# Patient Record
Sex: MALE | Race: WHITE | NOT HISPANIC OR LATINO | Employment: FULL TIME | URBAN - METROPOLITAN AREA
[De-identification: names, ages, dates, MRNs, and addresses within clinical notes are randomized per-mention and may not be internally consistent; named-entity substitution may affect disease eponyms.]

---

## 2022-01-05 ENCOUNTER — PATIENT MESSAGE (OUTPATIENT)
Dept: ADMINISTRATIVE | Facility: OTHER | Age: 37
End: 2022-01-05
Payer: COMMERCIAL

## 2025-01-06 ENCOUNTER — PATIENT OUTREACH (OUTPATIENT)
Dept: ADMINISTRATIVE | Facility: HOSPITAL | Age: 40
End: 2025-01-06
Payer: COMMERCIAL

## 2025-01-07 ENCOUNTER — LAB VISIT (OUTPATIENT)
Dept: LAB | Facility: OTHER | Age: 40
End: 2025-01-07
Payer: MEDICAID

## 2025-01-07 ENCOUNTER — OFFICE VISIT (OUTPATIENT)
Dept: INTERNAL MEDICINE | Facility: CLINIC | Age: 40
End: 2025-01-07
Payer: MEDICAID

## 2025-01-07 VITALS
OXYGEN SATURATION: 99 % | WEIGHT: 302.94 LBS | HEIGHT: 74 IN | HEART RATE: 78 BPM | DIASTOLIC BLOOD PRESSURE: 84 MMHG | BODY MASS INDEX: 38.88 KG/M2 | SYSTOLIC BLOOD PRESSURE: 120 MMHG

## 2025-01-07 DIAGNOSIS — M21.942 HAND DEFORMITY, ACQUIRED, LEFT: ICD-10-CM

## 2025-01-07 DIAGNOSIS — Z00.00 ANNUAL PHYSICAL EXAM: ICD-10-CM

## 2025-01-07 DIAGNOSIS — R29.818 SUSPECTED SLEEP APNEA: ICD-10-CM

## 2025-01-07 DIAGNOSIS — Z87.09 HISTORY OF ENLARGED TONSILS: ICD-10-CM

## 2025-01-07 DIAGNOSIS — B07.9 WART OF HAND: ICD-10-CM

## 2025-01-07 DIAGNOSIS — Z00.00 ANNUAL PHYSICAL EXAM: Primary | ICD-10-CM

## 2025-01-07 LAB
ALBUMIN SERPL BCP-MCNC: 4.1 G/DL (ref 3.5–5.2)
ALP SERPL-CCNC: 66 U/L (ref 40–150)
ALT SERPL W/O P-5'-P-CCNC: 38 U/L (ref 10–44)
ANION GAP SERPL CALC-SCNC: 9 MMOL/L (ref 8–16)
AST SERPL-CCNC: 22 U/L (ref 10–40)
BASOPHILS # BLD AUTO: 0.04 K/UL (ref 0–0.2)
BASOPHILS NFR BLD: 0.5 % (ref 0–1.9)
BILIRUB SERPL-MCNC: 0.3 MG/DL (ref 0.1–1)
BUN SERPL-MCNC: 14 MG/DL (ref 6–20)
CALCIUM SERPL-MCNC: 10 MG/DL (ref 8.7–10.5)
CHLORIDE SERPL-SCNC: 105 MMOL/L (ref 95–110)
CHOLEST SERPL-MCNC: 231 MG/DL (ref 120–199)
CHOLEST/HDLC SERPL: 6.4 {RATIO} (ref 2–5)
CO2 SERPL-SCNC: 26 MMOL/L (ref 23–29)
CREAT SERPL-MCNC: 1 MG/DL (ref 0.5–1.4)
DIFFERENTIAL METHOD BLD: NORMAL
EOSINOPHIL # BLD AUTO: 0.1 K/UL (ref 0–0.5)
EOSINOPHIL NFR BLD: 1.6 % (ref 0–8)
ERYTHROCYTE [DISTWIDTH] IN BLOOD BY AUTOMATED COUNT: 12.5 % (ref 11.5–14.5)
EST. GFR  (NO RACE VARIABLE): >60 ML/MIN/1.73 M^2
ESTIMATED AVG GLUCOSE: 108 MG/DL (ref 68–131)
GLUCOSE SERPL-MCNC: 97 MG/DL (ref 70–110)
HBA1C MFR BLD: 5.4 % (ref 4–5.6)
HCT VFR BLD AUTO: 45.4 % (ref 40–54)
HCV AB SERPL QL IA: NEGATIVE
HDLC SERPL-MCNC: 36 MG/DL (ref 40–75)
HDLC SERPL: 15.6 % (ref 20–50)
HGB BLD-MCNC: 15.2 G/DL (ref 14–18)
HIV 1+2 AB+HIV1 P24 AG SERPL QL IA: NEGATIVE
IMM GRANULOCYTES # BLD AUTO: 0.03 K/UL (ref 0–0.04)
IMM GRANULOCYTES NFR BLD AUTO: 0.4 % (ref 0–0.5)
LDLC SERPL CALC-MCNC: ABNORMAL MG/DL (ref 63–159)
LYMPHOCYTES # BLD AUTO: 2.5 K/UL (ref 1–4.8)
LYMPHOCYTES NFR BLD: 30.5 % (ref 18–48)
MCH RBC QN AUTO: 30.6 PG (ref 27–31)
MCHC RBC AUTO-ENTMCNC: 33.5 G/DL (ref 32–36)
MCV RBC AUTO: 92 FL (ref 82–98)
MONOCYTES # BLD AUTO: 0.6 K/UL (ref 0.3–1)
MONOCYTES NFR BLD: 7.4 % (ref 4–15)
NEUTROPHILS # BLD AUTO: 4.9 K/UL (ref 1.8–7.7)
NEUTROPHILS NFR BLD: 59.6 % (ref 38–73)
NONHDLC SERPL-MCNC: 195 MG/DL
NRBC BLD-RTO: 0 /100 WBC
PLATELET # BLD AUTO: 261 K/UL (ref 150–450)
PMV BLD AUTO: 11.2 FL (ref 9.2–12.9)
POTASSIUM SERPL-SCNC: 4.5 MMOL/L (ref 3.5–5.1)
PROT SERPL-MCNC: 8.1 G/DL (ref 6–8.4)
RBC # BLD AUTO: 4.96 M/UL (ref 4.6–6.2)
SODIUM SERPL-SCNC: 140 MMOL/L (ref 136–145)
TRIGL SERPL-MCNC: 436 MG/DL (ref 30–150)
WBC # BLD AUTO: 8.14 K/UL (ref 3.9–12.7)

## 2025-01-07 PROCEDURE — 1160F RVW MEDS BY RX/DR IN RCRD: CPT | Mod: CPTII,,,

## 2025-01-07 PROCEDURE — 85025 COMPLETE CBC W/AUTO DIFF WBC: CPT

## 2025-01-07 PROCEDURE — 86803 HEPATITIS C AB TEST: CPT

## 2025-01-07 PROCEDURE — 83036 HEMOGLOBIN GLYCOSYLATED A1C: CPT

## 2025-01-07 PROCEDURE — 3079F DIAST BP 80-89 MM HG: CPT | Mod: CPTII,,,

## 2025-01-07 PROCEDURE — 3008F BODY MASS INDEX DOCD: CPT | Mod: CPTII,,,

## 2025-01-07 PROCEDURE — 80061 LIPID PANEL: CPT

## 2025-01-07 PROCEDURE — 87389 HIV-1 AG W/HIV-1&-2 AB AG IA: CPT

## 2025-01-07 PROCEDURE — 1159F MED LIST DOCD IN RCRD: CPT | Mod: CPTII,,,

## 2025-01-07 PROCEDURE — 3074F SYST BP LT 130 MM HG: CPT | Mod: CPTII,,,

## 2025-01-07 PROCEDURE — 99999 PR PBB SHADOW E&M-EST. PATIENT-LVL IV: CPT | Mod: PBBFAC,,,

## 2025-01-07 PROCEDURE — 36415 COLL VENOUS BLD VENIPUNCTURE: CPT

## 2025-01-07 PROCEDURE — 80053 COMPREHEN METABOLIC PANEL: CPT

## 2025-01-07 PROCEDURE — 99385 PREV VISIT NEW AGE 18-39: CPT | Mod: S$PBB,,,

## 2025-01-07 PROCEDURE — 3044F HG A1C LEVEL LT 7.0%: CPT | Mod: CPTII,,,

## 2025-01-07 PROCEDURE — 99214 OFFICE O/P EST MOD 30 MIN: CPT | Mod: PBBFAC

## 2025-01-07 NOTE — PROGRESS NOTES
History & Physical  [facility]      SUBJECTIVE:     History of Present Illness:  Patient is a 39 y.o. male presents to clinic to establish care. Current diagnoses include hx of Hodgkin's lymphoma in remission, hx of forearm fracture    #Hx of Hodgkin's lymphoma  He has a history of Hodgkin's lymphoma diagnosed during teenage years, treated with chemotherapy and radiation with curative outcome. He previously maintained yearly follow-up visits but discontinued due to loss of insurance coverage. denies any concerning symptoms.    #Sleep concerns  He experiences frequent nighttime awakening, snoring, and breathing difficulties during sleep as noted by his partner. He reports significant daytime fatigue and difficulty starting his day. His fatigue may be exacerbated by lifestyle factors including childcare responsibilities, caring for his mother, work, and recent weight gain. He denies family history of sleep apnea.    #Enlarged tonsils  He has had an enlarged tonsil for over 10 years. He previously consulted ENT for tonsillectomy but required cancer clearance which was not pursued. He acknowledges the potential connection between enlarged tonsils and his sleep symptoms. He denies history of strep throat or tonsillitis.    #Finger wart  He has a painful wart on his knuckle that persists despite multiple freeze treatments, and a skin tag on the side. He has history of HPV infection at age 21-22 with a single wart that was removed, confirmed as non-cancerous strain.    Immunizations UTD  Last HgbA1C- due  Last lipid panel- due  Smoker- non-smoker  EtOH use- 1-2 drinks every few days  Drug use- THC seltzers, edibles   Sexual history- A daughter, currently sexually active, monogamous, hx of genital wart, removed     Review of patient's allergies indicates:   Allergen Reactions    Ceclor [cefaclor] Other (See Comments)     Seizures      Amoxicillin Rash    Pcn [penicillins] Rash       Past  "Medical History:   Diagnosis Date    Hodgkin's lymphoma      Past Surgical History:   Procedure Laterality Date    ELBOW FRACTURE SURGERY       Family History   Problem Relation Name Age of Onset    Anesthesia problems Neg Hx       Social History     Tobacco Use    Smoking status: Former     Current packs/day: 0.00     Types: Cigarettes     Quit date: 2013     Years since quittin.9   Substance Use Topics    Alcohol use: Yes     Comment: occasionally    Drug use: No        OBJECTIVE:     Vital Signs (Most Recent)  Vitals:    25 1118   BP: 120/84   BP Location: Left arm   Patient Position: Sitting   Pulse: 78   SpO2: 99%   Weight: (!) 137.4 kg (302 lb 14.6 oz)   Height: 6' 2" (1.88 m)         Physical Exam  Constitutional:       General: He is not in acute distress.     Appearance: Normal appearance. He is not toxic-appearing.   HENT:      Head: Normocephalic and atraumatic.      Right Ear: External ear normal.      Left Ear: External ear normal.      Nose: Nose normal.      Mouth/Throat:      Mouth: Mucous membranes are moist.   Eyes:      Extraocular Movements: Extraocular movements intact.   Cardiovascular:      Rate and Rhythm: Normal rate and regular rhythm.      Pulses: Normal pulses.      Heart sounds: Normal heart sounds.   Pulmonary:      Effort: Pulmonary effort is normal. No respiratory distress.   Abdominal:      General: Abdomen is flat.      Palpations: Abdomen is soft.      Tenderness: There is no abdominal tenderness.   Musculoskeletal:      Cervical back: Normal range of motion and neck supple.   Skin:     General: Skin is warm.      Findings: No bruising or erythema.   Neurological:      General: No focal deficit present.      Mental Status: He is alert.   Psychiatric:         Mood and Affect: Mood normal.           ASSESSMENT/PLAN:   39 y.o.male presents to clinic to establish care.     Assessed for potential sleep apnea based on reported symptoms  Considered enlarged tonsil as " possible contributor to sleep-related symptoms, will refer to ENT for evaluation  Evaluated wart on knuckle and skin tag, determining dermatology referral appropriate  Assessed tingling sensation in left arm, likely related to nerve compression  Evaluated long-standing finger injury, considering potential for hand surgeon referral if symptoms worsen    Oc was seen today for establish care and annual exam.    Diagnoses and all orders for this visit:    Annual physical exam  -     Hemoglobin A1C; Future  -     CBC Auto Differential; Future  -     Comprehensive Metabolic Panel; Future  -     HIV 1/2 Ag/Ab (4th Gen); Future  -     Hepatitis C Antibody; Future  -     Lipid Panel; Future    Suspected sleep apnea  -     Ambulatory referral/consult to Sleep Disorders; Future    Wart of hand  -     Ambulatory referral/consult to Dermatology; Future    History of enlarged tonsils  -     Ambulatory referral/consult to ENT; Future    Hand deformity, acquired, left  -     Ambulatory referral/consult to Hand Surgery; Future        Follow-up: 1 year or PRN for annual    This note was generated with the assistance of ambient listening technology. Verbal consent was obtained by the patient and accompanying visitor(s) for the recording of patient appointment to facilitate this note. I attest to having reviewed and edited the generated note for accuracy, though some syntax or spelling errors may persist. Please contact the author of this note for any clarification.      Hardy Us MD  [facility]

## 2025-01-10 ENCOUNTER — PATIENT MESSAGE (OUTPATIENT)
Dept: INTERNAL MEDICINE | Facility: CLINIC | Age: 40
End: 2025-01-10
Payer: MEDICAID

## 2025-01-10 ENCOUNTER — TELEPHONE (OUTPATIENT)
Dept: INTERNAL MEDICINE | Facility: CLINIC | Age: 40
End: 2025-01-10
Payer: MEDICAID

## 2025-01-10 NOTE — TELEPHONE ENCOUNTER
Informed Mr. Foleyabaroney a message was sent to Dr. Sarah about his concerns, and this MD is very good at getting back to his patients.

## 2025-01-10 NOTE — TELEPHONE ENCOUNTER
----- Message from Summer sent at 1/10/2025 12:12 PM CST -----  Regarding: cholesterol  Type:  Patient Returning Call        Name of who is calling:pt        What is request in detail:pt ios requesting a call back in regards to cholesterol, pt had an appt last week and blood work done and is cinsidered about his cholesterol results. Please assist.        Can clinic reply by [facility]:no        What number to call back if not in [facility]: [phone]

## 2025-01-15 ENCOUNTER — LAB VISIT (OUTPATIENT)
Dept: LAB | Facility: OTHER | Age: 40
End: 2025-01-15
Payer: MEDICAID

## 2025-01-15 DIAGNOSIS — E78.1 HYPERTRIGLYCERIDEMIA: ICD-10-CM

## 2025-01-15 LAB
CHOLEST SERPL-MCNC: 201 MG/DL (ref 120–199)
CHOLEST/HDLC SERPL: 7.4 {RATIO} (ref 2–5)
HDLC SERPL-MCNC: 27 MG/DL (ref 40–75)
HDLC SERPL: 13.4 % (ref 20–50)
LDLC SERPL CALC-MCNC: 115.4 MG/DL (ref 63–159)
NONHDLC SERPL-MCNC: 174 MG/DL
TRIGL SERPL-MCNC: 293 MG/DL (ref 30–150)

## 2025-01-15 PROCEDURE — 36415 COLL VENOUS BLD VENIPUNCTURE: CPT

## 2025-01-15 PROCEDURE — 83701 LIPOPROTEIN BLD HR FRACTION: CPT

## 2025-01-15 PROCEDURE — 80061 LIPID PANEL: CPT

## 2025-01-19 LAB — LDLC SERPL-MCNC: 134 MG/DL

## 2025-01-28 PROBLEM — M21.942: Status: ACTIVE | Noted: 2025-01-28

## 2025-01-28 PROBLEM — M25.642 STIFFNESS OF FINGER JOINT, LEFT: Status: ACTIVE | Noted: 2025-01-28

## 2025-02-26 ENCOUNTER — OFFICE VISIT (OUTPATIENT)
Dept: DERMATOLOGY | Facility: CLINIC | Age: 40
End: 2025-02-26
Payer: MEDICAID

## 2025-02-26 DIAGNOSIS — B07.9 WART OF HAND: ICD-10-CM

## 2025-02-26 DIAGNOSIS — L91.8 SKIN TAG: Primary | ICD-10-CM

## 2025-02-26 PROCEDURE — 99202 OFFICE O/P NEW SF 15 MIN: CPT | Mod: 25,S$PBB,, | Performed by: DERMATOLOGY

## 2025-02-26 PROCEDURE — 3044F HG A1C LEVEL LT 7.0%: CPT | Mod: CPTII,,, | Performed by: DERMATOLOGY

## 2025-02-26 PROCEDURE — 99212 OFFICE O/P EST SF 10 MIN: CPT | Mod: PBBFAC,PN | Performed by: DERMATOLOGY

## 2025-02-26 PROCEDURE — 17110 DESTRUCTION B9 LES UP TO 14: CPT | Mod: S$PBB,,, | Performed by: DERMATOLOGY

## 2025-02-26 PROCEDURE — 17110 DESTRUCTION B9 LES UP TO 14: CPT | Mod: PBBFAC,PN | Performed by: DERMATOLOGY

## 2025-02-26 PROCEDURE — 99999 PR PBB SHADOW E&M-EST. PATIENT-LVL II: CPT | Mod: PBBFAC,,, | Performed by: DERMATOLOGY

## 2025-02-26 NOTE — PROGRESS NOTES
Dermatology Outpatient Clinic Progress Note    Patient Name: Oc Crum  Patient : 1985  MRN: [id number]  Date of Service: 2025    CC/HPI: Oc Crum is a 39 y.o. year old male with history no history of skin cancer who presents today for verruca vulgaris on right index finger  Patient reports using over the counter treatments with no improvement. Pt also reports a large skin tag on his right side that itches and gets caught by clothing     ROS:   Dermatological ROS: positive for new lesions    Physical Exam   Chest   Chest comments: Pedunculated papule      Abdomen       Upper extremities           Diagram Legend     Erythematous scaling macule/papule c/w actinic keratosis       Vascular papule c/w angioma      Pigmented verrucoid papule/plaque c/w seborrheic keratosis      Yellow umbilicated papule c/w sebaceous hyperplasia      Irregularly shaped tan macule c/w lentigo     1-2 mm smooth white papules consistent with Milia      Movable subcutaneous cyst with punctum c/w epidermal inclusion cyst      Subcutaneous movable cyst c/w pilar cyst      Firm pink to brown papule c/w dermatofibroma      Pedunculated fleshy papule(s) c/w skin tag(s)      Evenly pigmented macule c/w junctional nevus     Mildly variegated pigmented, slightly irregular-bordered macule c/w mildly atypical nevus      Flesh colored to evenly pigmented papule c/w intradermal nevus       Pink pearly papule/plaque c/w basal cell carcinoma      Erythematous hyperkeratotic cursted plaque c/w SCC      Surgical scar with no sign of skin cancer recurrence      Open and closed comedones      Inflammatory papules and pustules      Verrucoid papule consistent consistent with wart     Erythematous eczematous patches and plaques     Dystrophic onycholytic nail with subungual debris c/w onychomycosis     Umbilicated papule    Erythematous-base heme-crusted tan verrucoid plaque consistent with inflamed seborrheic keratosis      Erythematous Silvery Scaling Plaque c/w Psoriasis     See annotation    Assessment/Plan:          Oc was seen today for warts.    Diagnoses and all orders for this visit:    Skin tag  Cryosurgery Procedure Note  The patient was informed that we would be destroying lesions via cryosurgery, the risks of pain, vesiculation, and potentially persistent discoloration were discussed.   Patient expressed verbal consent. 1 benign  lesions were treated with a 30 second freeze thaw cycle. Thicker lesions were treated with two cycles to enhance chances of complete resolution. Patient tolerated procedure well, no wound care necessary.     Follow up in 6 weeks if lesions do not resolve.     Wart of hand  -     Cryosurgery Procedure Note  The patient was informed that we would be destroying lesions via cryosurgery, the risks of pain, vesiculation, and potentially persistent discoloration were discussed.   Patient expressed verbal consent. 1 benign  lesions were treated with a 60 second freeze thaw cycle. Thicker lesions were treated with two cycles to enhance chances of complete resolution. Patient tolerated procedure well, no wound care necessary.     Follow up in 6 weeks if lesions do not resolve.   R.B.L of vaccination discussed - rec gardasil 9   -     hpv vaccine,9-winter (GARDASIL 9, PF,) 0.5 mL Syrg; 0, 2, 6 months        Follow up in 1 months        Ashia Green MD [facility]

## 2025-03-10 ENCOUNTER — OFFICE VISIT (OUTPATIENT)
Dept: OTOLARYNGOLOGY | Facility: CLINIC | Age: 40
End: 2025-03-10
Payer: MEDICAID

## 2025-03-10 VITALS
WEIGHT: 293 LBS | HEART RATE: 87 BPM | DIASTOLIC BLOOD PRESSURE: 88 MMHG | SYSTOLIC BLOOD PRESSURE: 143 MMHG | HEIGHT: 74 IN | BODY MASS INDEX: 37.6 KG/M2

## 2025-03-10 DIAGNOSIS — Z87.09 HISTORY OF ENLARGED TONSILS: ICD-10-CM

## 2025-03-10 DIAGNOSIS — J35.1 TONSILLAR HYPERTROPHY: Primary | ICD-10-CM

## 2025-03-10 DIAGNOSIS — J01.00 ACUTE NON-RECURRENT MAXILLARY SINUSITIS: ICD-10-CM

## 2025-03-10 DIAGNOSIS — Z85.71 HISTORY OF HODGKIN LYMPHOMA: ICD-10-CM

## 2025-03-10 DIAGNOSIS — G47.30 SLEEP-DISORDERED BREATHING: ICD-10-CM

## 2025-03-10 PROCEDURE — 1160F RVW MEDS BY RX/DR IN RCRD: CPT | Mod: CPTII,S$GLB,, | Performed by: STUDENT IN AN ORGANIZED HEALTH CARE EDUCATION/TRAINING PROGRAM

## 2025-03-10 PROCEDURE — 1159F MED LIST DOCD IN RCRD: CPT | Mod: CPTII,S$GLB,, | Performed by: STUDENT IN AN ORGANIZED HEALTH CARE EDUCATION/TRAINING PROGRAM

## 2025-03-10 PROCEDURE — 3079F DIAST BP 80-89 MM HG: CPT | Mod: CPTII,S$GLB,, | Performed by: STUDENT IN AN ORGANIZED HEALTH CARE EDUCATION/TRAINING PROGRAM

## 2025-03-10 PROCEDURE — 99204 OFFICE O/P NEW MOD 45 MIN: CPT | Mod: S$GLB,,, | Performed by: STUDENT IN AN ORGANIZED HEALTH CARE EDUCATION/TRAINING PROGRAM

## 2025-03-10 PROCEDURE — 3077F SYST BP >= 140 MM HG: CPT | Mod: CPTII,S$GLB,, | Performed by: STUDENT IN AN ORGANIZED HEALTH CARE EDUCATION/TRAINING PROGRAM

## 2025-03-10 PROCEDURE — 3044F HG A1C LEVEL LT 7.0%: CPT | Mod: CPTII,S$GLB,, | Performed by: STUDENT IN AN ORGANIZED HEALTH CARE EDUCATION/TRAINING PROGRAM

## 2025-03-10 PROCEDURE — 3008F BODY MASS INDEX DOCD: CPT | Mod: CPTII,S$GLB,, | Performed by: STUDENT IN AN ORGANIZED HEALTH CARE EDUCATION/TRAINING PROGRAM

## 2025-03-10 RX ORDER — DOXYCYCLINE HYCLATE 100 MG
100 TABLET ORAL 2 TIMES DAILY
Qty: 20 TABLET | Refills: 0 | Status: SHIPPED | OUTPATIENT
Start: 2025-03-10 | End: 2025-03-20

## 2025-03-10 NOTE — PROGRESS NOTES
Ear, Nose, & Throat  Otolaryngology - Head & Neck Surgery      Subjective:     Chief Complaint:  Tonsillar hypertrophy    Sleep History  Oc Crum is a 39 y.o. male who was referred to me by Dr. Hardy Us in consultation for tonsillar hypertrophy.    Patient has a history of Hodgkin's lymphoma has a teenager treated with CRT.    States he gets around 5-6 infections per year.  Does not frequently go to the doctor.  He has not been diagnosed previously with strep.  He has not taken antibiotics for any of these infections.  He does not get bothersome tonsil stones.    He had a state that he has had around 10 days of purulent rhinorrhea and bilateral maxillary sinus pressure.     Nighttime symptoms: snoring and witnessed apneas   Denies any urge to move legs and sensation of numbness/tingling in legs  Daytime symptoms: excessive fatigue, daytime somnolence, difficulty focusing, and napping   Denies any unintentional napping ( )    STOP-BAN    Occupation:  [employer]  Time to Sleep: 12  Wakes Around: 6  Nocturnal Awakenings: 1-2    Medical History     Comorbid Conditions:  None    BMI: Body mass index is 37.62 kg/m².   Neck Circumference: -    03/10/2025  ESS: 10  BERE: Incomplete   Difficulty falling asleep: 2   Difficulty staying asleep: 4   Difficulty waking up too early: 4    Smoker or former smoker? Yes -   Drink more than 5 alcoholic beverages a week? Yes -   Medications causing drowsiness? No  Medications for sleep? Yes - melatonin  Stimulants used? No    Nasal History    NOSE 03/10/2025: 3,2,3,4,2 (70%)  Nasal History:  States that outside of the past month he has not have significant sinonasal history.  He has had an upper respiratory infection which led to an acute sinusitis in the past month which is reflected in the scores above.  Nasal Surgeries: None      Past Medical History  Active Ambulatory Problems     Diagnosis Date Noted    Hodgkin's lymphoma     Stiffness of finger joint, left ring  "finger DIP 01/28/2025    Hand deformity, acquired, left 01/28/2025     Resolved Ambulatory Problems     Diagnosis Date Noted    No Resolved Ambulatory Problems     No Additional Past Medical History       Past Surgical History  He has a past surgical history that includes Elbow fracture surgery (2012).    Past Surgical History:   Procedure Laterality Date    ELBOW FRACTURE SURGERY  2012        Family History  His family history is not on file.    Social History  He reports that he quit smoking about 12 years ago. His smoking use included cigarettes. He does not have any smokeless tobacco history on file. He reports current alcohol use. He reports current drug use. Drug: Marijuana.    Allergies  He is allergic to ceclor [cefaclor], amoxicillin, and pcn [penicillins].    Medications  He has a current medication list which includes the following prescription(s): hpv vaccine,9-winter.    ROS:  Pertinent positive and negative review of systems as noted in HPI.     Objective:     BP (!) 143/88 (BP Location: Right arm, Patient Position: Sitting)   Pulse 87   Ht 6' 2" (1.88 m)   Wt 132.9 kg (293 lb)   BMI 37.62 kg/m²    Constitutional: Well appearing, communicating. No acute distress  Voice: Euphonic  Head/Face:   House Brackmann I Bilaterally  No masses on palption  Nose:    Septum Deviated Left    mild edematous turbinate hypertrophy   purulent rhinorrhea present   External Valve Collapse: mild   Modified Mariam: DNT   Sinus pressure: both maxillary  Oral Cavity   Dentition: good    Occlusion: class 1   DANGELO: >40 mm, no trismus   Hard Palate: normal   No tongue atrophy, symmetric protrusion  Oropharynx:   Tonsils: Symmetric, 4+, cryptic and with purulent exudate   Oconnell Tongue Position: 4    No pharyngeal erythema   Symmetric Palate elevation  Neck   Hyomental Distance: 3 fingerbreadth  Submandibular glands not enlarged and symmetric   Cervical lymph nodes not palpable   Thyroid gland not enlarged and " symmetric   Trachea midline   Laryngeal landmarks palpable   ROM: intact  Chest   No scars, no other implants   Soft tissue bulk: Significiant  Neuro/Psychiatric:     Affect: Appropriate  Respiratory:   No increased WOB  No stridor       Data Review:   LABS  WBC (K/uL)   Date Value   01/07/2025 8.14     Eos # (K/uL)   Date Value   01/07/2025 0.1     Platelets (K/uL)   Date Value   01/07/2025 261     Hemoglobin A1C (%)   Date Value   01/07/2025 5.4        I have independently reviewed the lab results shown above. Findings significant for the conditions being treated include:  Normal    IMAGING    No pertinent imaging available    AUDIO    Not performed    Procedures:       Assessment:     1. Tonsillar hypertrophy    2. History of Hodgkin lymphoma    3. Sleep-disordered breathing    4. Acute non-recurrent maxillary sinusitis        Plan:   We discussed a sleep disorder breathing.  He has a STOPBANG 5 placement high risk for were moderate to severe obstructive sleep apnea.  Home sleep study.  We will return to clinic following this.    Discussed briefly tonsillectomy.  Gets around 6 sore throats per year.  Has not been tested positive for strep in the past.  Has not gotten antibiotics for these in the past.  He is unsure if he wants to pursue tonsillectomy    Doxycycline for acute sinusitis.  We will reassess sinonasal symptoms once acute infection has resolved.    Voice recognition software was used in the creation of this note/communication and any sound-alike errors which may have occurred from its use should be taken in context when interpreting. If such errors prevent a clear understanding of the note/communication, please contact the office for clarification.     Problem List Items Addressed This Visit    None  Visit Diagnoses         Tonsillar hypertrophy    -  Primary      History of Hodgkin lymphoma          Sleep-disordered breathing          Acute non-recurrent maxillary sinusitis

## 2025-03-11 ENCOUNTER — TELEPHONE (OUTPATIENT)
Dept: SLEEP MEDICINE | Facility: OTHER | Age: 40
End: 2025-03-11
Payer: MEDICAID

## 2025-03-11 ENCOUNTER — HOSPITAL ENCOUNTER (OUTPATIENT)
Dept: SLEEP MEDICINE | Facility: OTHER | Age: 40
Discharge: HOME | End: 2025-03-11
Attending: STUDENT IN AN ORGANIZED HEALTH CARE EDUCATION/TRAINING PROGRAM
Payer: MEDICAID

## 2025-03-11 DIAGNOSIS — G47.30 SLEEP-DISORDERED BREATHING: ICD-10-CM

## 2025-03-11 PROCEDURE — 95800 SLP STDY UNATTENDED: CPT

## 2025-03-13 DIAGNOSIS — G47.33 OSA (OBSTRUCTIVE SLEEP APNEA): Primary | ICD-10-CM

## 2025-03-13 PROBLEM — G47.30 SLEEP-DISORDERED BREATHING: Status: ACTIVE | Noted: 2025-03-13

## 2025-03-13 NOTE — PROGRESS NOTES
Spoke with patient over the phone.  Discussed results of his recent home sleep study which showed severe obstructive sleep apnea.  Placed DME order for aPAP, 5-20. Establishes with sleep Medicine in July.  He needs follow up with me in 3 months.

## 2025-03-14 ENCOUNTER — PATIENT MESSAGE (OUTPATIENT)
Dept: OTOLARYNGOLOGY | Facility: CLINIC | Age: 40
End: 2025-03-14
Payer: MEDICAID

## 2025-03-24 ENCOUNTER — TELEPHONE (OUTPATIENT)
Dept: DERMATOLOGY | Facility: CLINIC | Age: 40
End: 2025-03-24
Payer: MEDICAID

## 2025-03-24 ENCOUNTER — PATIENT MESSAGE (OUTPATIENT)
Dept: DERMATOLOGY | Facility: CLINIC | Age: 40
End: 2025-03-24
Payer: MEDICAID

## 2025-03-25 ENCOUNTER — PATIENT MESSAGE (OUTPATIENT)
Dept: OTOLARYNGOLOGY | Facility: CLINIC | Age: 40
End: 2025-03-25
Payer: MEDICAID

## 2025-03-26 ENCOUNTER — OFFICE VISIT (OUTPATIENT)
Dept: DERMATOLOGY | Facility: CLINIC | Age: 40
End: 2025-03-26
Payer: MEDICAID

## 2025-03-26 DIAGNOSIS — B07.9 VERRUCA VULGARIS: ICD-10-CM

## 2025-03-26 DIAGNOSIS — B07.9 WART OF HAND: Primary | ICD-10-CM

## 2025-03-26 PROCEDURE — 99499 UNLISTED E&M SERVICE: CPT | Mod: S$PBB,,, | Performed by: DERMATOLOGY

## 2025-03-26 PROCEDURE — 17110 DESTRUCTION B9 LES UP TO 14: CPT | Mod: PBBFAC,PN | Performed by: DERMATOLOGY

## 2025-03-26 PROCEDURE — 17110 DESTRUCTION B9 LES UP TO 14: CPT | Mod: S$PBB,,, | Performed by: DERMATOLOGY

## 2025-03-26 PROCEDURE — 99999 PR PBB SHADOW E&M-EST. PATIENT-LVL I: CPT | Mod: PBBFAC,,, | Performed by: DERMATOLOGY

## 2025-03-26 PROCEDURE — 99211 OFF/OP EST MAY X REQ PHY/QHP: CPT | Mod: PBBFAC,PN | Performed by: DERMATOLOGY

## 2025-03-26 NOTE — PROGRESS NOTES
Dermatology Outpatient Clinic Progress Note    Patient Name: Oc Crum  Patient : 1985  MRN: [id number]  Date of Service: 3/26/2025    CC/HPI: Oc Crum is a 39 y.o. year old male with history of  no skin cancer  who presents today for 2nd LN2 treatment for wart on right second digit.  Patient reports wart has improved but not completely removed. Pain on finger has improved. Patient also reports not getting Gardisil vaccine since LOV due to pharmacy not having vac and did not hear back from them.     ROS:   Dermatological ROS: positive for persistent wart    Physical Exam   Upper extremities           Diagram Legend     Erythematous scaling macule/papule c/w actinic keratosis       Vascular papule c/w angioma      Pigmented verrucoid papule/plaque c/w seborrheic keratosis      Yellow umbilicated papule c/w sebaceous hyperplasia      Irregularly shaped tan macule c/w lentigo     1-2 mm smooth white papules consistent with Milia      Movable subcutaneous cyst with punctum c/w epidermal inclusion cyst      Subcutaneous movable cyst c/w pilar cyst      Firm pink to brown papule c/w dermatofibroma      Pedunculated fleshy papule(s) c/w skin tag(s)      Evenly pigmented macule c/w junctional nevus     Mildly variegated pigmented, slightly irregular-bordered macule c/w mildly atypical nevus      Flesh colored to evenly pigmented papule c/w intradermal nevus       Pink pearly papule/plaque c/w basal cell carcinoma      Erythematous hyperkeratotic cursted plaque c/w SCC      Surgical scar with no sign of skin cancer recurrence      Open and closed comedones      Inflammatory papules and pustules      Verrucoid papule consistent consistent with wart     Erythematous eczematous patches and plaques     Dystrophic onycholytic nail with subungual debris c/w onychomycosis     Umbilicated papule    Erythematous-base heme-crusted tan verrucoid plaque consistent with inflamed seborrheic keratosis     Erythematous  Silvery Scaling Plaque c/w Psoriasis     See annotation    Assessment/Plan:    Oc was seen today for ln2 treatment.    Diagnoses and all orders for this visit:    Wart of hand    Verruca vulgaris    Cryosurgery Procedure Note  The patient was informed that we would be destroying lesions via cryosurgery, the risks of pain, vesiculation, and potentially persistent discoloration were discussed.   Patient expressed verbal consent. 1 benign  lesions were treated with a 60 second freeze thaw cycle. Thicker lesions were treated with two cycles to enhance chances of complete resolution. Patient tolerated procedure well, no wound care necessary.     Follow up in 6 weeks if lesions do not resolve.      Follow up in 1 months        Ashia Green MD FA[facility]

## 2025-04-23 ENCOUNTER — OFFICE VISIT (OUTPATIENT)
Dept: DERMATOLOGY | Facility: CLINIC | Age: 40
End: 2025-04-23
Payer: MEDICAID

## 2025-04-23 DIAGNOSIS — B07.9 VERRUCA VULGARIS: Primary | ICD-10-CM

## 2025-04-23 DIAGNOSIS — L91.8 ACROCHORDON: ICD-10-CM

## 2025-04-23 PROCEDURE — 17110 DESTRUCTION B9 LES UP TO 14: CPT | Mod: S$PBB,,, | Performed by: DERMATOLOGY

## 2025-04-23 PROCEDURE — 17110 DESTRUCTION B9 LES UP TO 14: CPT | Mod: PBBFAC,PN | Performed by: DERMATOLOGY

## 2025-04-23 PROCEDURE — 99499 UNLISTED E&M SERVICE: CPT | Mod: S$PBB,,, | Performed by: DERMATOLOGY

## 2025-04-23 NOTE — PROGRESS NOTES
Dermatology Outpatient Clinic Progress Note    Patient Name: Oc Curm  Patient : 1985  MRN: [id number]  Date of Service: 2025    Subjective:      CC/HPI: Oc Crum is a 39 y.o. year old male with history of wart on right hand who presents today for one month follow up on wart on hand. Patient reports it looks like it needs another treatment and he also has a spot on his right thigh that caught get on clothing and hurts.  ROS:   Review of Systems    Objective:   Physical Exam   Constitutional: He appears well-developed and well-nourished.   Skin:               Diagram Legend     Erythematous scaling macule/papule c/w actinic keratosis       Vascular papule c/w angioma      Pigmented verrucoid papule/plaque c/w seborrheic keratosis      Yellow umbilicated papule c/w sebaceous hyperplasia      Irregularly shaped tan macule c/w lentigo     1-2 mm smooth white papules consistent with Milia      Movable subcutaneous cyst with punctum c/w epidermal inclusion cyst      Subcutaneous movable cyst c/w pilar cyst      Firm pink to brown papule c/w dermatofibroma      Pedunculated fleshy papule(s) c/w skin tag(s)      Evenly pigmented macule c/w junctional nevus     Mildly variegated pigmented, slightly irregular-bordered macule c/w mildly atypical nevus      Flesh colored to evenly pigmented papule c/w intradermal nevus       Pink pearly papule/plaque c/w basal cell carcinoma      Erythematous hyperkeratotic cursted plaque c/w SCC      Surgical scar with no sign of skin cancer recurrence      Open and closed comedones      Inflammatory papules and pustules      Verrucoid papule consistent consistent with wart     Erythematous eczematous patches and plaques     Dystrophic onycholytic nail with subungual debris c/w onychomycosis     Umbilicated papule    Erythematous-base heme-crusted tan verrucoid plaque consistent with inflamed seborrheic keratosis     Erythematous Silvery Scaling Plaque c/w Psoriasis      See annotation      Assessment / Plan:        Verruca vulgaris    Acrochordon         Cryosurgery Procedure Note  The patient was informed that we would be destroying lesions via cryosurgery, the risks of pain, vesiculation, and potentially persistent discoloration were discussed.   Patient expressed verbal consent. 2 benign  lesions were treated with a 60 second freeze thaw cycle. Thicker lesions were treated with two cycles to enhance chances of complete resolution. Patient tolerated procedure well, no wound care necessary.     Follow up in 6 weeks if lesions do not resolve.      Follow up in 4 weeks        Ashia Green MD FAAD

## 2025-06-17 ENCOUNTER — TELEPHONE (OUTPATIENT)
Dept: OTOLARYNGOLOGY | Facility: CLINIC | Age: 40
End: 2025-06-17
Payer: MEDICAID

## 2025-06-17 ENCOUNTER — PATIENT MESSAGE (OUTPATIENT)
Dept: OTOLARYNGOLOGY | Facility: CLINIC | Age: 40
End: 2025-06-17
Payer: MEDICAID

## 2025-06-23 ENCOUNTER — OFFICE VISIT (OUTPATIENT)
Dept: OTOLARYNGOLOGY | Facility: CLINIC | Age: 40
End: 2025-06-23
Payer: MEDICAID

## 2025-06-23 VITALS
BODY MASS INDEX: 37.6 KG/M2 | SYSTOLIC BLOOD PRESSURE: 124 MMHG | HEIGHT: 74 IN | WEIGHT: 293 LBS | HEART RATE: 72 BPM | DIASTOLIC BLOOD PRESSURE: 68 MMHG

## 2025-06-23 DIAGNOSIS — J03.91 RECURRENT TONSILLITIS: ICD-10-CM

## 2025-06-23 DIAGNOSIS — G47.33 OSA (OBSTRUCTIVE SLEEP APNEA): Primary | ICD-10-CM

## 2025-06-23 DIAGNOSIS — J35.1 TONSILLAR HYPERTROPHY: ICD-10-CM

## 2025-06-23 DIAGNOSIS — J01.00 ACUTE NON-RECURRENT MAXILLARY SINUSITIS: ICD-10-CM

## 2025-06-23 PROCEDURE — 3008F BODY MASS INDEX DOCD: CPT | Mod: CPTII,S$GLB,, | Performed by: STUDENT IN AN ORGANIZED HEALTH CARE EDUCATION/TRAINING PROGRAM

## 2025-06-23 PROCEDURE — 99214 OFFICE O/P EST MOD 30 MIN: CPT | Mod: S$GLB,,, | Performed by: STUDENT IN AN ORGANIZED HEALTH CARE EDUCATION/TRAINING PROGRAM

## 2025-06-23 PROCEDURE — 3044F HG A1C LEVEL LT 7.0%: CPT | Mod: CPTII,S$GLB,, | Performed by: STUDENT IN AN ORGANIZED HEALTH CARE EDUCATION/TRAINING PROGRAM

## 2025-06-23 PROCEDURE — 3078F DIAST BP <80 MM HG: CPT | Mod: CPTII,S$GLB,, | Performed by: STUDENT IN AN ORGANIZED HEALTH CARE EDUCATION/TRAINING PROGRAM

## 2025-06-23 PROCEDURE — 3074F SYST BP LT 130 MM HG: CPT | Mod: CPTII,S$GLB,, | Performed by: STUDENT IN AN ORGANIZED HEALTH CARE EDUCATION/TRAINING PROGRAM

## 2025-06-23 PROCEDURE — 1159F MED LIST DOCD IN RCRD: CPT | Mod: CPTII,S$GLB,, | Performed by: STUDENT IN AN ORGANIZED HEALTH CARE EDUCATION/TRAINING PROGRAM

## 2025-07-08 ENCOUNTER — PATIENT MESSAGE (OUTPATIENT)
Dept: SLEEP MEDICINE | Facility: CLINIC | Age: 40
End: 2025-07-08

## 2025-07-08 ENCOUNTER — OFFICE VISIT (OUTPATIENT)
Dept: SLEEP MEDICINE | Facility: CLINIC | Age: 40
End: 2025-07-08
Payer: MEDICAID

## 2025-07-08 VITALS — HEIGHT: 74 IN | WEIGHT: 291 LBS | BODY MASS INDEX: 37.35 KG/M2

## 2025-07-08 DIAGNOSIS — G47.33 OSA (OBSTRUCTIVE SLEEP APNEA): Primary | ICD-10-CM

## 2025-07-08 DIAGNOSIS — R35.1 NOCTURIA: ICD-10-CM

## 2025-07-08 DIAGNOSIS — G47.19 EXCESSIVE DAYTIME SLEEPINESS: ICD-10-CM

## 2025-07-08 DIAGNOSIS — F51.09 OTHER INSOMNIA NOT DUE TO A SUBSTANCE OR KNOWN PHYSIOLOGICAL CONDITION: ICD-10-CM

## 2025-07-08 PROCEDURE — 3044F HG A1C LEVEL LT 7.0%: CPT | Mod: CPTII,,, | Performed by: PHYSICIAN ASSISTANT

## 2025-07-08 PROCEDURE — 1159F MED LIST DOCD IN RCRD: CPT | Mod: CPTII,,, | Performed by: PHYSICIAN ASSISTANT

## 2025-07-08 PROCEDURE — 1160F RVW MEDS BY RX/DR IN RCRD: CPT | Mod: CPTII,,, | Performed by: PHYSICIAN ASSISTANT

## 2025-07-08 PROCEDURE — 3008F BODY MASS INDEX DOCD: CPT | Mod: CPTII,,, | Performed by: PHYSICIAN ASSISTANT

## 2025-07-08 PROCEDURE — 99213 OFFICE O/P EST LOW 20 MIN: CPT | Mod: PBBFAC | Performed by: PHYSICIAN ASSISTANT

## 2025-07-08 PROCEDURE — 99999 PR PBB SHADOW E&M-EST. PATIENT-LVL III: CPT | Mod: PBBFAC,,, | Performed by: PHYSICIAN ASSISTANT

## 2025-07-08 PROCEDURE — 99204 OFFICE O/P NEW MOD 45 MIN: CPT | Mod: S$PBB,,, | Performed by: PHYSICIAN ASSISTANT

## 2025-07-08 NOTE — PROGRESS NOTES
"Referred by Hardy Us MD     NEW PATIENT VISIT    Oc Crum  is a pleasant 39 y.o. male  with PMH significant for Hodgkin's lymphoma, BMI 37+, EFRAIN  who presents for EFRAIN management  following referral from PCP      Reports dx with EFRAIN in March of this year (AHI 54 on HST) through ENT during workup following c/o snoring, choking and witnessed apneas, poor disrupted (waking 3-5 x nightly) and un-refreshing sleep, and daytime sleepiness and fatigue. Stats he was ordered a CPAP machine, which he has been using nightly for the last 3 months with improvement in sx. States he is still having some lingering fatigue, but overall does note symptomatic improvement since starting on the therapy well ess fatigue overall and more consolidated sleep. Also reports resolution of the snoring since starting on therapy. States treatment has been going well overall. States mask interface (nasal hammock) is comfortable overall, but does leak occasionally which can disrupt sleep and cause discomfort. Is open to trying a new mask interface. Reports tolerating pressures well.       PAP history   Problems    Mask Nasal hammock   Pressure 5-20cwp   DME HME   Machine age AS 11 3/28/25   Download 7/8/25: 29/30 x 5hrs 56mins, 5-20cwp (6.8/9.2/10.4), leaks (21.7/59.4/92), AHI 1.6           Past Medical History:   Diagnosis Date    Hodgkin's lymphoma      Problem List[1]  Current Medications[2]       Vitals:    07/08/25 1509   Weight: 132 kg (291 lb 0.1 oz)   Height: 6' 2" (1.88 m)     Physical Exam:    GEN:   Well-appearing  Psych:  Appropriate affect, demonstrates insight  SKIN:  No rash on the face or bridge of the nose      LABS:   Lab Results   Component Value Date    HGB 15.2 01/07/2025    CO2 26 01/07/2025         RECORDS REVIEWED:    HST 3/12/25: AHi 54, RDI 63    ASSESSMENT        7/8/2025     3:05 PM   EPWORTH SLEEPINESS SCALE   Sitting and reading 1   Watching TV 1   Sitting, inactive in a public place (e.g. a theatre or a " meeting) 0   As a passenger in a car for an hour without a break 0   Lying down to rest in the afternoon when circumstances permit 2   Sitting and talking to someone 0   Sitting quietly after a lunch without alcohol 0   In a car, while stopped for a few minutes in traffic 0   Total score 4        Patient-reported       PROBLEM DESCRIPTION/ Sx on Presentation Interval Hx STATUS   Hx EFRAIN   + snoring, + choking arousals, + witnessed apneas  + wakes feeling un-refreshed  Good usage and efficiency; denies residual snoring/choking arousals Controlled    Daytime Sx   + sleepiness when inactive   ESS 4/24 on intake Improving since starting on therapy, still some lingering fatigue overall Improving    Insomnia   Trouble falling asleep:   Arousals:         3-5  Hard to get back to sleep?: varies    Prior pertinent medications:  Current pertinent medications:  Sleep is more consolidated since starting on CPAP, maybe only waking 1-2 x nightly now for nocturia/mask leak improved   Nocturia   x 2 per sleep period 1 x nightly stable   Other issues:       PLAN      -using and benefiting from CPAP therapy  -continue CPAP nightly  -discussed goals of CPAP usage 7-9hours nightly with usage >6hrs 100% of nights and minimal usage >4hrs 70% of nights recommended   -CPAP supplies ordered (WITH MASK FITTING; rec trial of NP mask interface)  -discussed EFRAIN and PAP with patient in detail, including possible complications of untreated EFRAIN like heart attack/stroke  -advised on strict driving precautions; advised never to drive drowsy     Advised on plan of care. Answered all patient questions. Patient verbalized understanding and voiced agreement with plan of care.     RTC 3-4 months or as needed      The patient was given open opportunity to ask questions and/or express concerns about treatment plan. All questions/concerns were discussed.     Two patient identifiers used prior to evaluation.                  [1]   Patient Active Problem  List  Diagnosis    Hodgkin's lymphoma    Stiffness of finger joint, left ring finger DIP    Hand deformity, acquired, left    Sleep-disordered breathing   [2]   Current Outpatient Medications:     hpv vaccine,9-winter (GARDASIL 9, PF,) 0.5 mL Syrg, 0, 2, 6 months (Patient not taking: Reported on 6/23/2025), Disp: 0.5 mL, Rfl: 2